# Patient Record
Sex: FEMALE | Race: ASIAN | NOT HISPANIC OR LATINO | URBAN - METROPOLITAN AREA
[De-identification: names, ages, dates, MRNs, and addresses within clinical notes are randomized per-mention and may not be internally consistent; named-entity substitution may affect disease eponyms.]

---

## 2018-10-16 ENCOUNTER — INPATIENT (INPATIENT)
Facility: HOSPITAL | Age: 48
LOS: 1 days | Discharge: HOME | DRG: 287 | End: 2018-10-18
Attending: STUDENT IN AN ORGANIZED HEALTH CARE EDUCATION/TRAINING PROGRAM | Admitting: HOSPITALIST
Payer: MEDICAID

## 2018-10-16 VITALS
HEART RATE: 94 BPM | RESPIRATION RATE: 16 BRPM | HEIGHT: 67 IN | WEIGHT: 174.17 LBS | DIASTOLIC BLOOD PRESSURE: 72 MMHG | OXYGEN SATURATION: 99 % | SYSTOLIC BLOOD PRESSURE: 138 MMHG | TEMPERATURE: 99 F

## 2018-10-16 DIAGNOSIS — R09.89 OTHER SPECIFIED SYMPTOMS AND SIGNS INVOLVING THE CIRCULATORY AND RESPIRATORY SYSTEMS: ICD-10-CM

## 2018-10-16 DIAGNOSIS — Z29.9 ENCOUNTER FOR PROPHYLACTIC MEASURES, UNSPECIFIED: ICD-10-CM

## 2018-10-16 DIAGNOSIS — R07.9 CHEST PAIN, UNSPECIFIED: ICD-10-CM

## 2018-10-16 DIAGNOSIS — I10 ESSENTIAL (PRIMARY) HYPERTENSION: ICD-10-CM

## 2018-10-16 DIAGNOSIS — E11.9 TYPE 2 DIABETES MELLITUS WITHOUT COMPLICATIONS: ICD-10-CM

## 2018-10-16 LAB
ALBUMIN SERPL ELPH-MCNC: 4.4 G/DL — SIGNIFICANT CHANGE UP (ref 3.3–5)
ALP SERPL-CCNC: 91 U/L — SIGNIFICANT CHANGE UP (ref 40–120)
ALT FLD-CCNC: 33 U/L — SIGNIFICANT CHANGE UP (ref 10–45)
ANION GAP SERPL CALC-SCNC: 13 MMOL/L — SIGNIFICANT CHANGE UP (ref 5–17)
AST SERPL-CCNC: 21 U/L — SIGNIFICANT CHANGE UP (ref 10–40)
BASOPHILS # BLD AUTO: 0 K/UL — SIGNIFICANT CHANGE UP (ref 0–0.2)
BASOPHILS NFR BLD AUTO: 0.3 % — SIGNIFICANT CHANGE UP (ref 0–2)
BILIRUB SERPL-MCNC: 0.5 MG/DL — SIGNIFICANT CHANGE UP (ref 0.2–1.2)
BUN SERPL-MCNC: 12 MG/DL — SIGNIFICANT CHANGE UP (ref 7–23)
CALCIUM SERPL-MCNC: 9.6 MG/DL — SIGNIFICANT CHANGE UP (ref 8.4–10.5)
CHLORIDE SERPL-SCNC: 103 MMOL/L — SIGNIFICANT CHANGE UP (ref 96–108)
CO2 SERPL-SCNC: 24 MMOL/L — SIGNIFICANT CHANGE UP (ref 22–31)
CREAT SERPL-MCNC: 0.71 MG/DL — SIGNIFICANT CHANGE UP (ref 0.5–1.3)
EOSINOPHIL # BLD AUTO: 0.1 K/UL — SIGNIFICANT CHANGE UP (ref 0–0.5)
EOSINOPHIL NFR BLD AUTO: 1.1 % — SIGNIFICANT CHANGE UP (ref 0–6)
GLUCOSE SERPL-MCNC: 217 MG/DL — HIGH (ref 70–99)
HCT VFR BLD CALC: 41.9 % — SIGNIFICANT CHANGE UP (ref 34.5–45)
HGB BLD-MCNC: 13.8 G/DL — SIGNIFICANT CHANGE UP (ref 11.5–15.5)
LIDOCAIN IGE QN: 46 U/L — SIGNIFICANT CHANGE UP (ref 7–60)
LYMPHOCYTES # BLD AUTO: 2.4 K/UL — SIGNIFICANT CHANGE UP (ref 1–3.3)
LYMPHOCYTES # BLD AUTO: 35.8 % — SIGNIFICANT CHANGE UP (ref 13–44)
MCHC RBC-ENTMCNC: 29.2 PG — SIGNIFICANT CHANGE UP (ref 27–34)
MCHC RBC-ENTMCNC: 33 GM/DL — SIGNIFICANT CHANGE UP (ref 32–36)
MCV RBC AUTO: 88.4 FL — SIGNIFICANT CHANGE UP (ref 80–100)
MONOCYTES # BLD AUTO: 0.7 K/UL — SIGNIFICANT CHANGE UP (ref 0–0.9)
MONOCYTES NFR BLD AUTO: 9.9 % — SIGNIFICANT CHANGE UP (ref 2–14)
NEUTROPHILS # BLD AUTO: 3.5 K/UL — SIGNIFICANT CHANGE UP (ref 1.8–7.4)
NEUTROPHILS NFR BLD AUTO: 53 % — SIGNIFICANT CHANGE UP (ref 43–77)
PLATELET # BLD AUTO: 238 K/UL — SIGNIFICANT CHANGE UP (ref 150–400)
POTASSIUM SERPL-MCNC: 4.6 MMOL/L — SIGNIFICANT CHANGE UP (ref 3.5–5.3)
POTASSIUM SERPL-SCNC: 4.6 MMOL/L — SIGNIFICANT CHANGE UP (ref 3.5–5.3)
PROT SERPL-MCNC: 7.5 G/DL — SIGNIFICANT CHANGE UP (ref 6–8.3)
RBC # BLD: 4.73 M/UL — SIGNIFICANT CHANGE UP (ref 3.8–5.2)
RBC # FLD: 12.8 % — SIGNIFICANT CHANGE UP (ref 10.3–14.5)
SODIUM SERPL-SCNC: 140 MMOL/L — SIGNIFICANT CHANGE UP (ref 135–145)
TROPONIN T, HIGH SENSITIVITY RESULT: 8 NG/L — SIGNIFICANT CHANGE UP (ref 0–51)
TROPONIN T, HIGH SENSITIVITY RESULT: 9 NG/L — SIGNIFICANT CHANGE UP (ref 0–51)
WBC # BLD: 6.6 K/UL — SIGNIFICANT CHANGE UP (ref 3.8–10.5)
WBC # FLD AUTO: 6.6 K/UL — SIGNIFICANT CHANGE UP (ref 3.8–10.5)

## 2018-10-16 PROCEDURE — 71046 X-RAY EXAM CHEST 2 VIEWS: CPT | Mod: 26

## 2018-10-16 PROCEDURE — 99285 EMERGENCY DEPT VISIT HI MDM: CPT

## 2018-10-16 PROCEDURE — 99223 1ST HOSP IP/OBS HIGH 75: CPT

## 2018-10-16 RX ORDER — FAMOTIDINE 10 MG/ML
20 INJECTION INTRAVENOUS DAILY
Qty: 0 | Refills: 0 | Status: DISCONTINUED | OUTPATIENT
Start: 2018-10-16 | End: 2018-10-18

## 2018-10-16 RX ORDER — ASPIRIN/CALCIUM CARB/MAGNESIUM 324 MG
324 TABLET ORAL ONCE
Qty: 0 | Refills: 0 | Status: COMPLETED | OUTPATIENT
Start: 2018-10-16 | End: 2018-10-16

## 2018-10-16 RX ORDER — INSULIN LISPRO 100/ML
VIAL (ML) SUBCUTANEOUS AT BEDTIME
Qty: 0 | Refills: 0 | Status: DISCONTINUED | OUTPATIENT
Start: 2018-10-16 | End: 2018-10-18

## 2018-10-16 RX ORDER — ASPIRIN/CALCIUM CARB/MAGNESIUM 324 MG
81 TABLET ORAL DAILY
Qty: 0 | Refills: 0 | Status: DISCONTINUED | OUTPATIENT
Start: 2018-10-16 | End: 2018-10-18

## 2018-10-16 RX ORDER — INSULIN LISPRO 100/ML
VIAL (ML) SUBCUTANEOUS
Qty: 0 | Refills: 0 | Status: DISCONTINUED | OUTPATIENT
Start: 2018-10-16 | End: 2018-10-18

## 2018-10-16 RX ORDER — DEXTROSE 50 % IN WATER 50 %
25 SYRINGE (ML) INTRAVENOUS ONCE
Qty: 0 | Refills: 0 | Status: DISCONTINUED | OUTPATIENT
Start: 2018-10-16 | End: 2018-10-18

## 2018-10-16 RX ORDER — INSULIN GLARGINE 100 [IU]/ML
20 INJECTION, SOLUTION SUBCUTANEOUS AT BEDTIME
Qty: 0 | Refills: 0 | Status: DISCONTINUED | OUTPATIENT
Start: 2018-10-16 | End: 2018-10-18

## 2018-10-16 RX ORDER — GLUCAGON INJECTION, SOLUTION 0.5 MG/.1ML
1 INJECTION, SOLUTION SUBCUTANEOUS ONCE
Qty: 0 | Refills: 0 | Status: DISCONTINUED | OUTPATIENT
Start: 2018-10-16 | End: 2018-10-18

## 2018-10-16 RX ORDER — DEXTROSE 50 % IN WATER 50 %
15 SYRINGE (ML) INTRAVENOUS ONCE
Qty: 0 | Refills: 0 | Status: DISCONTINUED | OUTPATIENT
Start: 2018-10-16 | End: 2018-10-18

## 2018-10-16 RX ORDER — METOPROLOL TARTRATE 50 MG
12.5 TABLET ORAL
Qty: 0 | Refills: 0 | Status: DISCONTINUED | OUTPATIENT
Start: 2018-10-16 | End: 2018-10-18

## 2018-10-16 RX ORDER — ENOXAPARIN SODIUM 100 MG/ML
40 INJECTION SUBCUTANEOUS EVERY 24 HOURS
Qty: 0 | Refills: 0 | Status: DISCONTINUED | OUTPATIENT
Start: 2018-10-16 | End: 2018-10-18

## 2018-10-16 RX ORDER — ATORVASTATIN CALCIUM 80 MG/1
80 TABLET, FILM COATED ORAL AT BEDTIME
Qty: 0 | Refills: 0 | Status: DISCONTINUED | OUTPATIENT
Start: 2018-10-16 | End: 2018-10-18

## 2018-10-16 RX ORDER — ACETAMINOPHEN 500 MG
650 TABLET ORAL EVERY 6 HOURS
Qty: 0 | Refills: 0 | Status: DISCONTINUED | OUTPATIENT
Start: 2018-10-16 | End: 2018-10-18

## 2018-10-16 RX ORDER — SODIUM CHLORIDE 9 MG/ML
1000 INJECTION, SOLUTION INTRAVENOUS
Qty: 0 | Refills: 0 | Status: DISCONTINUED | OUTPATIENT
Start: 2018-10-16 | End: 2018-10-18

## 2018-10-16 RX ADMIN — FAMOTIDINE 20 MILLIGRAM(S): 10 INJECTION INTRAVENOUS at 17:25

## 2018-10-16 RX ADMIN — Medication 30 MILLILITER(S): at 17:25

## 2018-10-16 NOTE — ED PROVIDER NOTE - NS ED ROS FT
ROS: denies HA, weakness, dizziness, fevers/chills, nausea/vomiting, diaphoresis, back/neck pain, dysuria/hematuria, or rash  +ab pain, chest pain, sob

## 2018-10-16 NOTE — H&P ADULT - NSHPSOCIALHISTORY_GEN_ALL_CORE
No family hx of MI or CVA  Social History:    Marital Status:  (   )    (   ) Single    (   )    (  )   Occupation:   Lives with: (  ) alone  (  ) children   (  ) spouse   (  ) parents  (  ) other    Substance Use (street drugs): ( x ) never used  (  ) other:  Tobacco Usage:  ( x  ) never smoked   (   ) former smoker   (   ) current smoker  (     ) pack year  (        ) last cigarette date  Alcohol Usage: no etoh use No family hx of MI or CVA  Social History:    Marital Status:  ( x  )    (   ) Single    (   )    (  )   Occupation:   Lives with: (  ) alone  (  x) children   (  ) spouse   (  ) parents  (  ) other    Substance Use (street drugs): ( x ) never used  (  ) other:  Tobacco Usage:  ( x  ) never smoked   (   ) former smoker   (   ) current smoker  (     ) pack year  (        ) last cigarette date  Alcohol Usage: no etoh use

## 2018-10-16 NOTE — ED ADULT NURSE NOTE - NSIMPLEMENTINTERV_GEN_ALL_ED
Implemented All Universal Safety Interventions:  [place] to call system. Call bell, personal items and telephone within reach. Instruct patient to call for assistance. Room bathroom lighting operational. Non-slip footwear when patient is off stretcher. Physically safe environment: no spills, clutter or unnecessary equipment. Stretcher in lowest position, wheels locked, appropriate side rails in place.

## 2018-10-16 NOTE — ED PROVIDER NOTE - PHYSICAL EXAMINATION
Gen: AAOx3, non-toxic  Head: NCAT  HEENT: EOMI, oral mucosa moist, normal conjunctiva  Lung: CTAB, no respiratory distress, no wheezes/rhonchi/rales B/L, speaking in full sentences  CV: RRR, no murmurs, rubs or gallops  Abd: soft, tenderness in the epigastric region, ND, no guarding, no CVA tenderness, neg desai's sign  MSK: no visible deformities  Neuro: No focal sensory or motor deficits  Skin: Warm, well perfused, no rash  Psych: normal affect.   ~Ellen Ortega PGY1

## 2018-10-16 NOTE — ED PROVIDER NOTE - MEDICAL DECISION MAKING DETAILS
Attending Matt Wisdom DO: 49 yo female hx of DM, HTN, HLD, presents with chest pain x "years", worsening over past few weeks. Follows with cardiologist at [facility], has angiogram scheduled in 3 days. Also with abdominal discomfort in epigastrium, non radiating. No nausea or vomiting. Chest pain radiates to left arm, "all the time", nonexertional. PE: well appearing, nad, ab soft, +mild epigastric ttp, no rebound or guarding. A/P: pt with atypical chest pain, ekg normal, scheduled for angio in 3 days at outside facility. Will check labs, ekg, trop, cxr, ?delta trop and outpatient follow up vs admit for stress and possible angio here.

## 2018-10-16 NOTE — H&P ADULT - NSHPREVIEWOFSYSTEMS_GEN_ALL_CORE
CONSTITUTIONAL: No weakness, fevers or chills  EYES/ENT: No visual changes;  No dysphagia  NECK: No pain or stiffness  RESPIRATORY: No cough, wheezing, hemoptysis; + shortness of breath  CARDIOVASCULAR: + chest pain  no  palpitations; No lower extremity edema  EXTREMITIES: no le edema, cyanosis, clubbing  GASTROINTESTINAL: No abdominal or epigastric pain. No nausea, vomiting, or hematemesis; No diarrhea or constipation. No melena or hematochezia.  BACK: No back pain  GENITOURINARY: No dysuria, frequency or hematuria  NEUROLOGICAL: No numbness or weakness  MSK: no joint swelling or pain  SKIN: No itching, burning, rashes, or lesions   PSYCH: no agitation  All other review of systems is negative unless indicated above.

## 2018-10-16 NOTE — ED PROVIDER NOTE - PMH
On Nifedipine XL 90 mg daily at home. BP has been controlled here.   - goal BP <150/90  - c/w nifedipine XL 90 mg daily    Fran To, PGY-1  Cardiology Consult Service  Pager# [phone] DM2 (diabetes mellitus, type 2)    HTN (hypertension)

## 2018-10-16 NOTE — H&P ADULT - NSHPPHYSICALEXAM_GEN_ALL_CORE
Vital Signs Last 24 Hrs  T(C): 37.1 (16 Oct 2018 18:46), Max: 37.1 (16 Oct 2018 18:46)  T(F): 98.8 (16 Oct 2018 18:46), Max: 98.8 (16 Oct 2018 18:46)  HR: 66 (16 Oct 2018 18:46) (66 - 94)  BP: 105/65 (16 Oct 2018 18:46) (105/65 - 138/72)  BP(mean): --  RR: 18 (16 Oct 2018 18:46) (16 - 18)  SpO2: 99% (16 Oct 2018 18:46) (99% - 99%) Vital Signs Last 24 Hrs  T(C): 37.1 (16 Oct 2018 18:46), Max: 37.1 (16 Oct 2018 18:46)  T(F): 98.8 (16 Oct 2018 18:46), Max: 98.8 (16 Oct 2018 18:46)  HR: 66 (16 Oct 2018 18:46) (66 - 94)  BP: 105/65 (16 Oct 2018 18:46) (105/65 - 138/72)  BP(mean): --  RR: 18 (16 Oct 2018 18:46) (16 - 18)  SpO2: 99% (16 Oct 2018 18:46) (99% - 99%)    GENERAL: No acute distress, well-developed  HEAD:  Atraumatic, Normocephalic  ENT: EOMI, PERRLA, conjunctiva and sclera clear,  moist mucosa no pharyngeal erythema or exudates   NECK: supple , no JVD   CHEST/LUNG: Clear to auscultation bilaterally; No wheeze, equal breath sounds bilaterally   BACK: No spinal tenderness,  No CVA tenderness   HEART: Regular rate and rhythm; No murmurs, rubs, or gallops  ABDOMEN: Soft, Nontender, Nondistended; Bowel sounds present  EXTREMITIES:  No clubbing, cyanosis, or edema  MSK: No joint swelling or effusions, ROM intact   PSYCH: Normal behavior/affect  NEUROLOGY: AAOx3, non-focal, cranial nerves intact  SKIN: Normal color, No rashes or lesions

## 2018-10-16 NOTE — ED PROVIDER NOTE - OBJECTIVE STATEMENT
Pt is a 49 yo F with pmhx of HTN, DM2, HLD presents with 2 week of constant substernal chest pain that radiates down her left arm. Pt states that her pain has been constant and gradually getting worst. Pt describes the pain as a squeezing chest pain that's both at rest and on exertion. She also reports SOB on exertion but denies n&V. Pt also report epigastric pain that also started 1 week ago. She describes the epigastric pain as a non radiating burning pain that is worst at night . Pt follows a outside cardiologist and has an appt for a cardiac angiogram in 2 days at a [facility]. She denies cough, fever, chills, vision changes, constipation, diarrhea blood in stool or urine.

## 2018-10-16 NOTE — ED ADULT NURSE REASSESSMENT NOTE - NS ED NURSE REASSESS COMMENT FT1
provided two gowns and fall socks for patient. patient states she feels too uncomfortable to take her pants off at this time.   privacy screens and chair provided for patient and family. pt resting comfortably in no acute distress.

## 2018-10-16 NOTE — CONSULT NOTE ADULT - SUBJECTIVE AND OBJECTIVE BOX
Cardiology Attending Consult Note      CHIEF COMPLAINT/REASON FOR CONSULT: chest pain  Date of Admission: 10-16-18    Translation service utilized : Kiswahili  # [id number]    HISTORY OF PRESENT ILLNESS:    48y.o. Female with DM, HTN, HL, now admitted with chest pain x 2 weeks. She reports that for the last 2 weeks she has had chest pain (substernal, pressure like, 5/10, some radiation to left arm, worse on exertion). She also notes burning in her stomach and epigastric area. She denies shortness of breath. No N/V/D/f/C. She was seen by a cardiologist at [facility] (Dr. Cummins), and underwent a stress test which she was told was positive, and was scheduled for angiography on 10/19/2018, however she's requested a repeat evaluation here. At the time of my visit she has mild chest pain (1-2/10).    ALLERGIES:  NKDA    Home Medications:  ASA 81 mg po QD  Atorvastatin 20 gm po QHS  Janumet      MEDICATIONS:      famotidine    Tablet 20 milliGRAM(s) Oral daily          PAST MEDICAL & SURGICAL HISTORY:  DM2 (diabetes mellitus, type 2)  HTN (hypertension)  No significant past surgical history      FAMILY HISTORY:  No family hx of MI or CVA    SOCIAL HISTORY:    Never smoked, no ETOH, no IVDU    REVIEW OF SYSTEMS:    CONSTITUTIONAL: No weakness, fevers or chills  EYES/ENT: No visual changes;  No vertigo or throat pain   NECK: No pain or stiffness  RESPIRATORY: No cough, wheezing, hemoptysis; No shortness of breath  CARDIOVASCULAR: +chest pain, No palpitations  GASTROINTESTINAL: No abdominal or epigastric pain. No nausea, vomiting, or hematemesis; No diarrhea or constipation. No melena or hematochezia.  GENITOURINARY: No dysuria, frequency or hematuria  NEUROLOGICAL: No numbness or weakness  SKIN: No itching, burning, rashes, or lesions   All other review of systems is negative unless indicated above.    PHYSICAL EXAM:  T(C): 37.1 (10-16-18 @ 18:46), Max: 37.1 (10-16-18 @ 18:46)  HR: 66 (10-16-18 @ 18:46) (66 - 94)  BP: 105/65 (10-16-18 @ 18:46) (105/65 - 138/72)  RR: 18 (10-16-18 @ 18:46) (16 - 18)  SpO2: 99% (10-16-18 @ 18:46) (99% - 99%)  Wt(kg): --    Drug Dosing Weight  Height (cm): 170.18 (16 Oct 2018 14:54)  Weight (kg): 79 (16 Oct 2018 14:54)  BMI (kg/m2): 27.3 (16 Oct 2018 14:54)  BSA (m2): 1.91 (16 Oct 2018 14:54)    I&O's Summary      Appearance: Normal	  HEENT:   Normal oral mucosa, PERRL, EOMI	  Lymphatic: No lymphadenopathy  Cardiovascular: Normal S1 S2, No JVD, No murmurs  Respiratory: Lungs clear to auscultation	  Psychiatry: A & O x 3, Mood & affect appropriate  Gastrointestinal:  Soft, Non-tender, + BS	  Skin: No rashes, No ecchymoses, No cyanosis	  Neurologic: Non-focal  Extremities: Normal range of motion, No clubbing, cyanosis or edema  Vascular: Peripheral pulses palpable 2+ bilaterally    LABS:	 	    CBC Full  -  ( 16 Oct 2018 17:07 )  WBC Count : 6.6 K/uL  Hemoglobin : 13.8 g/dL  Hematocrit : 41.9 %  Platelet Count - Automated : 238 K/uL  Mean Cell Volume : 88.4 fl  Mean Cell Hemoglobin : 29.2 pg  Mean Cell Hemoglobin Concentration : 33.0 gm/dL  Auto Neutrophil # : 3.5 K/uL  Auto Lymphocyte # : 2.4 K/uL  Auto Monocyte # : 0.7 K/uL  Auto Eosinophil # : 0.1 K/uL  Auto Basophil # : 0.0 K/uL  Auto Neutrophil % : 53.0 %  Auto Lymphocyte % : 35.8 %  Auto Monocyte % : 9.9 %  Auto Eosinophil % : 1.1 %  Auto Basophil % : 0.3 %    10-16    140  |  103  |  12  ----------------------------<  217<H>  4.6   |  24  |  0.71    Ca    9.6      16 Oct 2018 17:07    TPro  7.5  /  Alb  4.4  /  TBili  0.5  /  DBili  x   /  AST  21  /  ALT  33  /  AlkPhos  91  10-16      LIVER FUNCTIONS - ( 16 Oct 2018 17:07 )  Alb: 4.4 g/dL / Pro: 7.5 g/dL / ALK PHOS: 91 U/L / ALT: 33 U/L / AST: 21 U/L / GGT: x             EKG: NSR, no ST or TW Changes    Telemetry: NSR, no events    CXR: clear lungs BL    TTE: None      A/P: 48y.o. Female with DM, HTN, HL, now admitted with chest pain x 2 weeks. She reports that for the last 2 weeks she has had chest pain (substernal, pressure like, 5/10, some radiation to left arm, worse on exertion). She also notes burning in her stomach and epigastric area. She denies shortness of breath. No N/V/D/f/C. No palpitations. She was seen by a cardiologist at [facility] (Dr. Cummins), and underwent a stress test which she was told was positive, and was scheduled for angiography on 10/19/2018, however she's requested a repeat evaluation here. At the time of my visit she has mild chest pain (1-2/10).    1. Chest Pain - Patient with progressive chest pain over the last 2 weeks, both at rest and on exertion, concerning for stable vs progressive angina. Doubt acute atherothrombotic event as cardiac enzymes negative x 2 (hsTrop 8 -> 9), EKG without ST or TW changes.  -Given multiple risk factors including DM, HTN, HL, would evaluate for reversible ischemia with pharmacologic MPI.  -Serial EKG if progression or recurrence in chest pain (important to capture EKG a time chest pain is occurring)  -Cont ASA 81 mg po QD  -Start Metoprolol 12.5 mg po BID for anti-anginal therapy  -Increase Atorvastatin to 80 mg po QHS  -Agree with Empiric therapy for GERD with Pepcid 20 mg po QD  -Monitor on Telemetry  -Please order Pharmacologic MPI.   -Please keep NPO (no caffeine) after midnight in anticipation of MPI.    2. DM - DEAN  -Please order Hemoglobin A1c, Lipid Panel, TSH, BNP    3. HTN - BP controlled (138/72)  -Start Metoprolol as above for anti-anginal therapy    Thank you for this interesting consult. My team will continue to follow along with you.    South Wilson MD  Cardiology Attending  [facility]   Cell: [phone]  (Cardiology Nocturnist cell number available 7 pm - 7 am every night; available daytime week days for follow-up only; daytime weekends covered by general cardiology consult service)

## 2018-10-16 NOTE — ED ADULT NURSE NOTE - OBJECTIVE STATEMENT
48 y.o female presents to the ed c.o chest pain for years. hx of htn, dm, hld. states her chest pain has been worsening , has an angiogram scheduled in three days at outside facility. states her chest pain is in her chest , moving to her left arm and this is her baseline chest pain. Patient also c.o abdominal pain, beginning in the epigastric area and radiating around her abdomen. Patient denies nausea, vomiting, diarrhea. patient is well appearing, abdomen is soft ntnd, ekg nsr and patient is in no acute distress. placed on cardiac monitor and large bore IV placed by Martha KIRKPATRICK in right ac. patient took one baby aspirin this morning.

## 2018-10-16 NOTE — ED ADULT TRIAGE NOTE - CHIEF COMPLAINT QUOTE
pt reports chest pain over the last few weeks that worsened today. pt scheduled to have angiogram in a few days  h/o DM, HTN, HLD

## 2018-10-16 NOTE — H&P ADULT - PROBLEM SELECTOR PLAN 2
-Hold oral hypoglycemics  -Start HISS, check fsg qac/qhs  -check a1c in am  -consistent carb diet  - Lantus 20 U Hs

## 2018-10-16 NOTE — H&P ADULT - PROBLEM SELECTOR PLAN 1
CXR clear , no ischemic ekg changes , negative cardiac enzymes,  however, high risk for  cardiac disease cardiology recommends inpatient stress,  given recent travel preceding symptoms as well as pleuritic characteristics would check for PE .   - Cardiology consult appreciated   - telemetry   - Nuclear stress test   - f/u DDimer - if positive obtain CTA chest   - am lipid panel , TSH , and A1c   - continue daily aspirin   - Atorvastatin 80mg   - starting  metoprolol 12.5 bid CXR clear , no ischemic ekg changes , negative cardiac enzymes,  however, high risk for  cardiac disease cardiology recommends inpatient stress,  given recent travel preceding symptoms as well as pleuritic characteristics would check for PE .   - Cardiology consult appreciated   - telemetry   - Nuclear stress test   - f/u DDimer - if positive obtain CTA chest   - Venous duplex LE  to r/o DVT   - am lipid panel , TSH , and A1c   - continue daily aspirin   - Atorvastatin 80mg   - starting  metoprolol 12.5 bid

## 2018-10-16 NOTE — H&P ADULT - HISTORY OF PRESENT ILLNESS
Patient is a 48 year old female with pmhx of HTN, DM2, HLD presents with chest pain for the past two weeks. Patient reports constant substernal chest pain, worsening over the past two weeks, radiates down her left arm,  described as a squeezing in quality ,  both at rest and on exertion, not relieved by anything in particular. She also reports  increased dyspnea on exertion.  Patient also reports  intermittent epigastric burning pain . Patient was evaluated by a cardiologist ( at [facility]) and  scheduled for a cardiac catheterization. However symptoms have remained persistent prompting patient to seek more urgent care.   Patient reports no fever, no cough .No palpitations , no nausea or vomiting . no lower extremity edema. No orthopnea. history obtained via [facility] phone  #[phone], and as well as  patients daughter   Patient is a 48 year old female with pmhx of HTN, DM2, HLD presents with chest pain for the past two weeks. Patient reports constant substernal chest pain, worsening over the past two weeks, radiates down her left arm,  described as a squeezing in quality , worsened with deep breaths, occuring  both at rest and on exertion, not relieved by anything in particular. She also reports  increased dyspnea on exertion.  Patient also reports  intermittent epigastric burning pain . Patient was evaluated by a cardiologist ( at [facility]) and  scheduled for a cardiac catheterization. However symptoms have remained persistent prompting patient to seek more urgent care.   Patient reports no fever, no cough .No palpitations , no nausea or vomiting . no lower extremity edema. No orthopnea.  She also reports ongoing  right lower extremity pain over the past year. She reports recent trip from [facility] about one month prior to admission.

## 2018-10-16 NOTE — H&P ADULT - NSHPLABSRESULTS_GEN_ALL_CORE
Labs personally reviewed:                          13.8   6.6   )-----------( 238      ( 16 Oct 2018 17:07 )             41.9     10-16    140  |  103  |  12  ----------------------------<  217<H>  4.6   |  24  |  0.71    Ca    9.6      16 Oct 2018 17:07    TPro  7.5  /  Alb  4.4  /  TBili  0.5  /  DBili  x   /  AST  21  /  ALT  33  /  AlkPhos  91  10-16      LIVER FUNCTIONS - ( 16 Oct 2018 17:07 )  Alb: 4.4 g/dL / Pro: 7.5 g/dL / ALK PHOS: 91 U/L / ALT: 33 U/L / AST: 21 U/L / GGT: x           CAPILLARY BLOOD GLUCOSE    Imaging:  CXR personally reviewed: no focal opacity    EKG personally reviewed: normal sinus rhythm, 75  Normal axis, Normal TX interval and QRS complex. There are no acute ischemic ST or T-wave changes

## 2018-10-16 NOTE — ED ADULT NURSE REASSESSMENT NOTE - NS ED NURSE REASSESS COMMENT FT1
patient states she felt relief in her chest from the medication.   educated family and patient on reason for second troponin.   vital signs stable remains nsr on monitor. in no distress.

## 2018-10-17 DIAGNOSIS — Z29.9 ENCOUNTER FOR PROPHYLACTIC MEASURES, UNSPECIFIED: ICD-10-CM

## 2018-10-17 DIAGNOSIS — I20.0 UNSTABLE ANGINA: ICD-10-CM

## 2018-10-17 DIAGNOSIS — I10 ESSENTIAL (PRIMARY) HYPERTENSION: ICD-10-CM

## 2018-10-17 DIAGNOSIS — E78.5 HYPERLIPIDEMIA, UNSPECIFIED: ICD-10-CM

## 2018-10-17 LAB
ALBUMIN SERPL ELPH-MCNC: 4.1 G/DL — SIGNIFICANT CHANGE UP (ref 3.3–5)
ALP SERPL-CCNC: 76 U/L — SIGNIFICANT CHANGE UP (ref 40–120)
ALT FLD-CCNC: 32 U/L — SIGNIFICANT CHANGE UP (ref 10–45)
ANION GAP SERPL CALC-SCNC: 10 MMOL/L — SIGNIFICANT CHANGE UP (ref 5–17)
AST SERPL-CCNC: 18 U/L — SIGNIFICANT CHANGE UP (ref 10–40)
BASOPHILS # BLD AUTO: 0 K/UL — SIGNIFICANT CHANGE UP (ref 0–0.2)
BASOPHILS NFR BLD AUTO: 0.7 % — SIGNIFICANT CHANGE UP (ref 0–2)
BILIRUB SERPL-MCNC: 0.7 MG/DL — SIGNIFICANT CHANGE UP (ref 0.2–1.2)
BUN SERPL-MCNC: 12 MG/DL — SIGNIFICANT CHANGE UP (ref 7–23)
CALCIUM SERPL-MCNC: 9.1 MG/DL — SIGNIFICANT CHANGE UP (ref 8.4–10.5)
CHLORIDE SERPL-SCNC: 106 MMOL/L — SIGNIFICANT CHANGE UP (ref 96–108)
CHOLEST SERPL-MCNC: 96 MG/DL — SIGNIFICANT CHANGE UP (ref 10–199)
CO2 SERPL-SCNC: 25 MMOL/L — SIGNIFICANT CHANGE UP (ref 22–31)
CREAT SERPL-MCNC: 0.78 MG/DL — SIGNIFICANT CHANGE UP (ref 0.5–1.3)
D DIMER BLD IA.RAPID-MCNC: <150 NG/ML DDU — SIGNIFICANT CHANGE UP
EOSINOPHIL # BLD AUTO: 0.1 K/UL — SIGNIFICANT CHANGE UP (ref 0–0.5)
EOSINOPHIL NFR BLD AUTO: 1.8 % — SIGNIFICANT CHANGE UP (ref 0–6)
GLUCOSE SERPL-MCNC: 157 MG/DL — HIGH (ref 70–99)
HBA1C BLD-MCNC: 9.2 % — HIGH (ref 4–5.6)
HCT VFR BLD CALC: 41.2 % — SIGNIFICANT CHANGE UP (ref 34.5–45)
HDLC SERPL-MCNC: 40 MG/DL — LOW
HGB BLD-MCNC: 13.6 G/DL — SIGNIFICANT CHANGE UP (ref 11.5–15.5)
LIPID PNL WITH DIRECT LDL SERPL: 44 MG/DL — SIGNIFICANT CHANGE UP
LYMPHOCYTES # BLD AUTO: 2.5 K/UL — SIGNIFICANT CHANGE UP (ref 1–3.3)
LYMPHOCYTES # BLD AUTO: 40 % — SIGNIFICANT CHANGE UP (ref 13–44)
MCHC RBC-ENTMCNC: 29.2 PG — SIGNIFICANT CHANGE UP (ref 27–34)
MCHC RBC-ENTMCNC: 33 GM/DL — SIGNIFICANT CHANGE UP (ref 32–36)
MCV RBC AUTO: 88.5 FL — SIGNIFICANT CHANGE UP (ref 80–100)
MONOCYTES # BLD AUTO: 0.6 K/UL — SIGNIFICANT CHANGE UP (ref 0–0.9)
MONOCYTES NFR BLD AUTO: 9.6 % — SIGNIFICANT CHANGE UP (ref 2–14)
NEUTROPHILS # BLD AUTO: 3 K/UL — SIGNIFICANT CHANGE UP (ref 1.8–7.4)
NEUTROPHILS NFR BLD AUTO: 47.8 % — SIGNIFICANT CHANGE UP (ref 43–77)
NT-PROBNP SERPL-SCNC: 12 PG/ML — SIGNIFICANT CHANGE UP (ref 0–300)
PLATELET # BLD AUTO: 215 K/UL — SIGNIFICANT CHANGE UP (ref 150–400)
POTASSIUM SERPL-MCNC: 3.8 MMOL/L — SIGNIFICANT CHANGE UP (ref 3.5–5.3)
POTASSIUM SERPL-SCNC: 3.8 MMOL/L — SIGNIFICANT CHANGE UP (ref 3.5–5.3)
PROT SERPL-MCNC: 6.8 G/DL — SIGNIFICANT CHANGE UP (ref 6–8.3)
RBC # BLD: 4.66 M/UL — SIGNIFICANT CHANGE UP (ref 3.8–5.2)
RBC # FLD: 12.7 % — SIGNIFICANT CHANGE UP (ref 10.3–14.5)
SODIUM SERPL-SCNC: 141 MMOL/L — SIGNIFICANT CHANGE UP (ref 135–145)
TOTAL CHOLESTEROL/HDL RATIO MEASUREMENT: 2.4 RATIO — LOW (ref 3.3–7.1)
TRIGL SERPL-MCNC: 58 MG/DL — SIGNIFICANT CHANGE UP (ref 10–149)
TSH SERPL-MCNC: 4.24 UIU/ML — HIGH (ref 0.27–4.2)
WBC # BLD: 6.2 K/UL — SIGNIFICANT CHANGE UP (ref 3.8–10.5)
WBC # FLD AUTO: 6.2 K/UL — SIGNIFICANT CHANGE UP (ref 3.8–10.5)

## 2018-10-17 PROCEDURE — 93010 ELECTROCARDIOGRAM REPORT: CPT

## 2018-10-17 PROCEDURE — 99233 SBSQ HOSP IP/OBS HIGH 50: CPT

## 2018-10-17 PROCEDURE — 93970 EXTREMITY STUDY: CPT | Mod: 26

## 2018-10-17 RX ORDER — INFLUENZA VIRUS VACCINE 15; 15; 15; 15 UG/.5ML; UG/.5ML; UG/.5ML; UG/.5ML
0.5 SUSPENSION INTRAMUSCULAR ONCE
Qty: 0 | Refills: 0 | Status: COMPLETED | OUTPATIENT
Start: 2018-10-17 | End: 2018-10-17

## 2018-10-17 RX ORDER — SENNA PLUS 8.6 MG/1
2 TABLET ORAL AT BEDTIME
Qty: 0 | Refills: 0 | Status: DISCONTINUED | OUTPATIENT
Start: 2018-10-17 | End: 2018-10-18

## 2018-10-17 RX ADMIN — Medication 30 MILLILITER(S): at 11:33

## 2018-10-17 RX ADMIN — Medication 3: at 19:03

## 2018-10-17 RX ADMIN — ENOXAPARIN SODIUM 40 MILLIGRAM(S): 100 INJECTION SUBCUTANEOUS at 05:41

## 2018-10-17 RX ADMIN — Medication 81 MILLIGRAM(S): at 11:33

## 2018-10-17 RX ADMIN — FAMOTIDINE 20 MILLIGRAM(S): 10 INJECTION INTRAVENOUS at 11:33

## 2018-10-17 RX ADMIN — Medication 1: at 09:00

## 2018-10-17 RX ADMIN — Medication 30 MILLILITER(S): at 21:47

## 2018-10-17 RX ADMIN — INSULIN GLARGINE 20 UNIT(S): 100 INJECTION, SOLUTION SUBCUTANEOUS at 21:47

## 2018-10-17 RX ADMIN — SENNA PLUS 2 TABLET(S): 8.6 TABLET ORAL at 21:47

## 2018-10-17 RX ADMIN — Medication 12.5 MILLIGRAM(S): at 05:41

## 2018-10-17 RX ADMIN — Medication 12.5 MILLIGRAM(S): at 19:03

## 2018-10-17 RX ADMIN — Medication 1: at 12:38

## 2018-10-17 RX ADMIN — ATORVASTATIN CALCIUM 80 MILLIGRAM(S): 80 TABLET, FILM COATED ORAL at 21:47

## 2018-10-17 NOTE — PROGRESS NOTE ADULT - ATTENDING COMMENTS
Thank you. My team will follow.    Tono Kumari M.D, F.A.C.C  [facility]   [phone]

## 2018-10-17 NOTE — PROGRESS NOTE ADULT - PROBLEM SELECTOR PLAN 1
CXR clear , no ischemic ekg changes , negative cardiac enzymes,   abnormal stress test as an outpatient, mild reversible defect in anterior wall. Due ongoing chest pain, she was scheduled to have a cardiac tomorrow at [place] but presented to [facility] ED last night  - DDimer: neg, b/l LE: neg for DVT  - Cardiology consult appreciated   - Continue ASA 81mg po daily, toprol xl 12.5mg po daily, lipitor 40mg po daily. - Scheduled for cardiac cath today r/o obstructive CAD.  - TTE for evaluation of LV function and r/o valvular abnormalities.   - telemetry   - continue daily aspirin ,Atorvastatin 80mg , metoprolol 12.5 bid

## 2018-10-17 NOTE — PROGRESS NOTE ADULT - PROBLEM SELECTOR PLAN 1
Continue ASA 81mg po daily, toprol xl 12.5mg po daily, lipitor 40mg po daily. Scheduled for cardiac cath today r/o obstructive CAD.  I have spoken with primary care physician, patient had an abnormal stress test as an outpatient, mild reversible defect in anterior wall. Due ongoing chest pain, she was scheduled to have a cardiac tomorrow at [place] but presented to [facility] ED last night. TTE for evaluation of LV function and r/o valvular abnormalities.

## 2018-10-17 NOTE — PROGRESS NOTE ADULT - PROBLEM SELECTOR PLAN 2
-Hold oral hypoglycemics  -Start HISS,   -check a1c: 9.2 uncontrolled   -consistent carb diet  - Lantus 20 U Hs  - FS well controlled inpatient

## 2018-10-17 NOTE — PROGRESS NOTE ADULT - SUBJECTIVE AND OBJECTIVE BOX
Patient is a 48y old  Female who presents with a chief complaint of chest pain x two weeks (17 Oct 2018 15:16)    [id number]     SUBJECTIVE / OVERNIGHT EVENTS: Patient seen and examined at bedside. Denies any SOB, abdominal pain and n/v. She c/o pleuritic chest pain.      ROS:  All other review of systems negative    Allergies    No Known Allergies    Intolerances        MEDICATIONS  (STANDING):  aspirin enteric coated 81 milliGRAM(s) Oral daily  atorvastatin 80 milliGRAM(s) Oral at bedtime  dextrose 5%. 1000 milliLiter(s) (50 mL/Hr) IV Continuous <Continuous>  dextrose 50% Injectable 25 Gram(s) IV Push once  enoxaparin Injectable 40 milliGRAM(s) SubCutaneous every 24 hours  famotidine    Tablet 20 milliGRAM(s) Oral daily  influenza   Vaccine 0.5 milliLiter(s) IntraMuscular once  insulin glargine Injectable (LANTUS) 20 Unit(s) SubCutaneous at bedtime  insulin lispro (HumaLOG) corrective regimen sliding scale   SubCutaneous three times a day before meals  insulin lispro (HumaLOG) corrective regimen sliding scale   SubCutaneous at bedtime  metoprolol tartrate 12.5 milliGRAM(s) Oral two times a day    MEDICATIONS  (PRN):  acetaminophen   Tablet .. 650 milliGRAM(s) Oral every 6 hours PRN Mild Pain (1 - 3)  dextrose 40% Gel 15 Gram(s) Oral once PRN Blood Glucose LESS THAN 70 milliGRAM(s)/deciliter  glucagon  Injectable 1 milliGRAM(s) IntraMuscular once PRN Glucose LESS THAN 70 milligrams/deciliter      Vital Signs Last 24 Hrs  T(C): 36.6 (17 Oct 2018 08:17), Max: 37.1 (16 Oct 2018 18:46)  T(F): 97.8 (17 Oct 2018 08:17), Max: 98.8 (16 Oct 2018 18:46)  HR: 635 (17 Oct 2018 08:17) (62 - 635)  BP: 99/65 (17 Oct 2018 08:17) (99/65 - 105/65)  BP(mean): --  RR: 18 (17 Oct 2018 08:17) (18 - 18)  SpO2: 97% (17 Oct 2018 08:17) (96% - 99%)  CAPILLARY BLOOD GLUCOSE      POCT Blood Glucose.: 168 mg/dL (17 Oct 2018 12:19)  POCT Blood Glucose.: 157 mg/dL (17 Oct 2018 08:36)  POCT Blood Glucose.: 159 mg/dL (16 Oct 2018 23:37)    I&O's Summary    17 Oct 2018 07:01  -  17 Oct 2018 16:11  --------------------------------------------------------  IN: 240 mL / OUT: 0 mL / NET: 240 mL        PHYSICAL EXAM:  GENERAL: NAD, well-developed  HEAD:  Atraumatic, Normocephalic  EYES: EOMI, PERRLA, conjunctiva and sclera clear  NECK: Supple, No JVD  CHEST/LUNG: Clear to auscultation bilaterally; No wheeze  HEART: Regular rate and rhythm; No murmurs, rubs, or gallops  ABDOMEN: Soft, Nontender, Nondistended; Bowel sounds present  EXTREMITIES:  2+ Peripheral Pulses, No clubbing, cyanosis, or edema  NEUROLOGY: AAOx3, non-focal  PSYCH: calm  SKIN: No rashes or lesions    LABS:                        13.6   6.2   )-----------( 215      ( 17 Oct 2018 06:58 )             41.2     10-17    141  |  106  |  12  ----------------------------<  157<H>  3.8   |  25  |  0.78    Ca    9.1      17 Oct 2018 06:58    TPro  6.8  /  Alb  4.1  /  TBili  0.7  /  DBili  x   /  AST  18  /  ALT  32  /  AlkPhos  76  10-17              RADIOLOGY & ADDITIONAL TESTS:    Imaging Personally Reviewed: B/L LE doppler results reviewed     Consultant(s) Notes Reviewed:  Cardiology rec noted     Care Discussed with Consultants/Other Providers: D/w Dr. Kumari, pt to have [facility] today     Case Discussed with Family: d/w daughter Kiley over the phone

## 2018-10-17 NOTE — DIETITIAN INITIAL EVALUATION ADULT. - ENERGY NEEDS
IBW=  135  lbs +/- 10%  Admission history: 48 F w/   PMH of HTN, DM2, HLD, admitted  with c/o chest pain x 2 weeks .

## 2018-10-17 NOTE — PROGRESS NOTE ADULT - SUBJECTIVE AND OBJECTIVE BOX
PROGRESS NOTE  Reason for follow up: chest pain  Overnight: No new events.   Update: Patient lying in bed complaining of chest pressure and shortness of breath. Repeat EKG done at time of chest pain negative for ischemia, no st-t wave changes.     Subjective: "I have pain"  Complains of: chest pain  Review of symptoms: Cardiac:  No chest pain. No dyspnea. No palpitations.  Respiratory:no cough. No dyspnea  Gastrointestinal: No diarrhea. No abdominal pain. No bleeding.     Past medical history: No updates.   Chronic conditions:  Hypertension: controlled. DM: Stable. Hyperlipidemia: Stable;    	  Vitals:  T(C): 36.6 (10-17-18 @ 08:17), Max: 37.1 (10-16-18 @ 18:46)  HR: 635 (10-17-18 @ 08:17) (62 - 635)  BP: 99/65 (10-17-18 @ 08:17) (99/65 - 105/65)  RR: 18 (10-17-18 @ 08:17) (18 - 18)  SpO2: 97% (10-17-18 @ 08:17) (96% - 99%)  Wt(kg): --  I&O's Summary    17 Oct 2018 07:01  -  17 Oct 2018 15:16  --------------------------------------------------------  IN: 240 mL / OUT: 0 mL / NET: 240 mL      Weight (kg): 80 (10-16 @ 23:33)    PHYSICAL EXAM:  Appearance: Comfortable. No acute distress  HEENT:  Head and neck: Atraumatic. Normocephalic.  Normal oral mucosa, PERRL, Neck is supple. No JVD, No carotid bruit.   Neurologic: A & O x 3, no focal deficits. EOMI   Lymphatic: No cervical lymphadenopathy  Cardiovascular: Normal S1 S2, No murmur, rubs/gallops. No JVD, No edema  Respiratory: Lungs clear to auscultation  Gastrointestinal:  Soft, Non-tender, + BS  Lower Extremities: No edema  Psychiatry: Patient is calm. No agitation. Mood & affect appropriate  Skin: No rashes/ ecchymoses/cyanosis/ulcers visualized on the face, hands or feet.    CURRENT MEDICATIONS:  metoprolol tartrate 12.5 milliGRAM(s) Oral two times a day    famotidine    Tablet  atorvastatin  dextrose 50% Injectable  insulin glargine Injectable (LANTUS)  insulin lispro (HumaLOG) corrective regimen sliding scale  insulin lispro (HumaLOG) corrective regimen sliding scale  aspirin enteric coated  dextrose 5%.  enoxaparin Injectable  influenza   Vaccine      LABS:	 	  CARDIAC MARKERS ( 17 Oct 2018 06:58 )  x     / x     / x     / x     / x      p-BNP 17 Oct 2018 06:58: 12 pg/mL                            13.6   6.2   )-----------( 215      ( 17 Oct 2018 06:58 )             41.2     10-17    141  |  106  |  12  ----------------------------<  157<H>  3.8   |  25  |  0.78    Ca    9.1      17 Oct 2018 06:58    TPro  6.8  /  Alb  4.1  /  TBili  0.7  /  DBili  x   /  AST  18  /  ALT  32  /  AlkPhos  76  10-17    proBNP: Serum Pro-Brain Natriuretic Peptide: 12 pg/mL (10-17 @ 06:58)    Lipid Profile: Date: 10-17 @ 08:57  Total cholesterol 96; Direct LDL: 44; HDL: 40; Triglycerides:58    HgA1c: Hemoglobin A1C, Whole Blood: 9.2 %  TSH:     TELEMETRY: Reviewed    ECG:  Reviewed by me. 	normal sinus rhythm, no st-t wave changes    DIAGNOSTIC TESTING:    [ ]  Catheterization: pending

## 2018-10-17 NOTE — DIETITIAN INITIAL EVALUATION ADULT. - PROBLEM SELECTOR PLAN 1
CXR clear , no ischemic ekg changes , negative cardiac enzymes,  however, high risk for  cardiac disease cardiology recommends inpatient stress,  given recent travel preceding symptoms as well as pleuritic characteristics would check for PE .   - Cardiology consult appreciated   - telemetry   - Nuclear stress test   - f/u DDimer - if positive obtain CTA chest   - Venous duplex LE  to r/o DVT   - am lipid panel , TSH , and A1c   - continue daily aspirin   - Atorvastatin 80mg   - starting  metoprolol 12.5 bid

## 2018-10-18 ENCOUNTER — TRANSCRIPTION ENCOUNTER (OUTPATIENT)
Age: 48
End: 2018-10-18

## 2018-10-18 VITALS
RESPIRATION RATE: 18 BRPM | DIASTOLIC BLOOD PRESSURE: 65 MMHG | OXYGEN SATURATION: 98 % | SYSTOLIC BLOOD PRESSURE: 101 MMHG | HEART RATE: 57 BPM | TEMPERATURE: 98 F

## 2018-10-18 DIAGNOSIS — K21.9 GASTRO-ESOPHAGEAL REFLUX DISEASE WITHOUT ESOPHAGITIS: ICD-10-CM

## 2018-10-18 LAB
ANION GAP SERPL CALC-SCNC: 13 MMOL/L — SIGNIFICANT CHANGE UP (ref 5–17)
BUN SERPL-MCNC: 14 MG/DL — SIGNIFICANT CHANGE UP (ref 7–23)
CALCIUM SERPL-MCNC: 9.1 MG/DL — SIGNIFICANT CHANGE UP (ref 8.4–10.5)
CHLORIDE SERPL-SCNC: 105 MMOL/L — SIGNIFICANT CHANGE UP (ref 96–108)
CO2 SERPL-SCNC: 22 MMOL/L — SIGNIFICANT CHANGE UP (ref 22–31)
CREAT SERPL-MCNC: 0.68 MG/DL — SIGNIFICANT CHANGE UP (ref 0.5–1.3)
GLUCOSE SERPL-MCNC: 189 MG/DL — HIGH (ref 70–99)
HCT VFR BLD CALC: 43 % — SIGNIFICANT CHANGE UP (ref 34.5–45)
HGB BLD-MCNC: 14.1 G/DL — SIGNIFICANT CHANGE UP (ref 11.5–15.5)
MCHC RBC-ENTMCNC: 28.9 PG — SIGNIFICANT CHANGE UP (ref 27–34)
MCHC RBC-ENTMCNC: 32.8 GM/DL — SIGNIFICANT CHANGE UP (ref 32–36)
MCV RBC AUTO: 88.2 FL — SIGNIFICANT CHANGE UP (ref 80–100)
PLATELET # BLD AUTO: 231 K/UL — SIGNIFICANT CHANGE UP (ref 150–400)
POTASSIUM SERPL-MCNC: 4.1 MMOL/L — SIGNIFICANT CHANGE UP (ref 3.5–5.3)
POTASSIUM SERPL-SCNC: 4.1 MMOL/L — SIGNIFICANT CHANGE UP (ref 3.5–5.3)
RBC # BLD: 4.88 M/UL — SIGNIFICANT CHANGE UP (ref 3.8–5.2)
RBC # FLD: 12.2 % — SIGNIFICANT CHANGE UP (ref 10.3–14.5)
SODIUM SERPL-SCNC: 140 MMOL/L — SIGNIFICANT CHANGE UP (ref 135–145)
WBC # BLD: 6.9 K/UL — SIGNIFICANT CHANGE UP (ref 3.8–10.5)
WBC # FLD AUTO: 6.9 K/UL — SIGNIFICANT CHANGE UP (ref 3.8–10.5)

## 2018-10-18 PROCEDURE — 76700 US EXAM ABDOM COMPLETE: CPT | Mod: 26

## 2018-10-18 PROCEDURE — 99239 HOSP IP/OBS DSCHRG MGMT >30: CPT

## 2018-10-18 PROCEDURE — 93306 TTE W/DOPPLER COMPLETE: CPT | Mod: 26

## 2018-10-18 RX ORDER — LISINOPRIL 2.5 MG/1
1 TABLET ORAL
Qty: 0 | Refills: 0 | COMMUNITY

## 2018-10-18 RX ORDER — SITAGLIPTIN AND METFORMIN HYDROCHLORIDE 500; 50 MG/1; MG/1
1 TABLET, FILM COATED ORAL
Qty: 0 | Refills: 0 | COMMUNITY

## 2018-10-18 RX ORDER — INSULIN DETEMIR 100/ML (3)
20 INSULIN PEN (ML) SUBCUTANEOUS
Qty: 0 | Refills: 0 | COMMUNITY

## 2018-10-18 RX ORDER — ATORVASTATIN CALCIUM 80 MG/1
1 TABLET, FILM COATED ORAL
Qty: 0 | Refills: 0 | COMMUNITY

## 2018-10-18 RX ORDER — METOPROLOL TARTRATE 50 MG
0.5 TABLET ORAL
Qty: 30 | Refills: 0 | OUTPATIENT
Start: 2018-10-18 | End: 2018-11-16

## 2018-10-18 RX ORDER — HYDRALAZINE HCL 50 MG
10 TABLET ORAL ONCE
Qty: 0 | Refills: 0 | Status: DISCONTINUED | OUTPATIENT
Start: 2018-10-18 | End: 2018-10-18

## 2018-10-18 RX ORDER — PANTOPRAZOLE SODIUM 20 MG/1
40 TABLET, DELAYED RELEASE ORAL
Qty: 0 | Refills: 0 | Status: DISCONTINUED | OUTPATIENT
Start: 2018-10-18 | End: 2018-10-18

## 2018-10-18 RX ORDER — ASPIRIN/CALCIUM CARB/MAGNESIUM 324 MG
1 TABLET ORAL
Qty: 0 | Refills: 0 | COMMUNITY

## 2018-10-18 RX ORDER — PANTOPRAZOLE SODIUM 20 MG/1
1 TABLET, DELAYED RELEASE ORAL
Qty: 30 | Refills: 0 | OUTPATIENT
Start: 2018-10-18 | End: 2018-11-16

## 2018-10-18 RX ADMIN — Medication 81 MILLIGRAM(S): at 11:35

## 2018-10-18 RX ADMIN — Medication 2: at 08:48

## 2018-10-18 RX ADMIN — Medication 2: at 13:12

## 2018-10-18 RX ADMIN — PANTOPRAZOLE SODIUM 40 MILLIGRAM(S): 20 TABLET, DELAYED RELEASE ORAL at 11:35

## 2018-10-18 RX ADMIN — ENOXAPARIN SODIUM 40 MILLIGRAM(S): 100 INJECTION SUBCUTANEOUS at 06:01

## 2018-10-18 RX ADMIN — Medication 12.5 MILLIGRAM(S): at 06:02

## 2018-10-18 NOTE — DISCHARGE NOTE ADULT - ADDITIONAL INSTRUCTIONS
f/u with PCP within 1 week of discharge from hospital f/u with PCP within 1 week of discharge from hospital  f/u with [phone]

## 2018-10-18 NOTE — DISCHARGE NOTE ADULT - MEDICATION SUMMARY - MEDICATIONS TO TAKE
I will START or STAY ON the medications listed below when I get home from the hospital:    aspirin 81 mg oral tablet  -- 1 tab(s) by mouth once a day  -- Indication: For antiplatelet    Janumet 50 mg-1000 mg oral tablet  -- 1 tab(s) by mouth 2 times a day  -- Indication: For Diabetes    Levemir 100 units/mL subcutaneous solution  -- 20 unit(s) subcutaneous once a day (at bedtime)  -- Indication: For Diabetes    atorvastatin 20 mg oral tablet  -- 1 tab(s) by mouth once a day  -- Indication: For Cholesterol    metoprolol tartrate 25 mg oral tablet  -- 0.5 tab(s) by mouth 2 times a day   -- It is very important that you take or use this exactly as directed.  Do not skip doses or discontinue unless directed by your doctor.  May cause drowsiness.  Alcohol may intensify this effect.  Use care when operating dangerous machinery.  Some non-prescription drugs may aggravate your condition.  Read all labels carefully.  If a warning appears, check with your doctor before taking.  Take with food or milk.  This drug may impair the ability to drive or operate machinery.  Use care until you become familiar with its effects.    -- Indication: For HTN and HR    pantoprazole 40 mg oral delayed release tablet  -- 1 tab(s) by mouth once a day (before a meal)  -- Indication: For GI

## 2018-10-18 NOTE — PROVIDER CONTACT NOTE (MEDICATION) - ASSESSMENT
At 3am site was CDI no redness warmth noted. At 0600 Rn assessed site area surrounding dressing was red. At 3am site was CDI no redness warmth noted. At 0600 Rn assessed skin surrounding dressing was red. Pulses +2 palpation. No swelling, no drainage, no warmth

## 2018-10-18 NOTE — PROGRESS NOTE ADULT - SUBJECTIVE AND OBJECTIVE BOX
Patient is a 48y old  Female who presents with a chief complaint of chest pain x two weeks (18 Oct 2018 11:26)    [facility] : Rama [id number]   SUBJECTIVE / OVERNIGHT EVENTS: Patient seen and examined at bedside. Denies any chest pain, SOB, and n/v. Pt c/o burning sensation in epigastric region, chronic complaint.     ROS:  All other review of systems negative    Allergies    No Known Allergies    Intolerances        MEDICATIONS  (STANDING):  aspirin enteric coated 81 milliGRAM(s) Oral daily  atorvastatin 80 milliGRAM(s) Oral at bedtime  dextrose 5%. 1000 milliLiter(s) (50 mL/Hr) IV Continuous <Continuous>  dextrose 50% Injectable 25 Gram(s) IV Push once  enoxaparin Injectable 40 milliGRAM(s) SubCutaneous every 24 hours  influenza   Vaccine 0.5 milliLiter(s) IntraMuscular once  insulin glargine Injectable (LANTUS) 20 Unit(s) SubCutaneous at bedtime  insulin lispro (HumaLOG) corrective regimen sliding scale   SubCutaneous three times a day before meals  insulin lispro (HumaLOG) corrective regimen sliding scale   SubCutaneous at bedtime  metoprolol tartrate 12.5 milliGRAM(s) Oral two times a day  pantoprazole    Tablet 40 milliGRAM(s) Oral before breakfast    MEDICATIONS  (PRN):  acetaminophen   Tablet .. 650 milliGRAM(s) Oral every 6 hours PRN Mild Pain (1 - 3)  dextrose 40% Gel 15 Gram(s) Oral once PRN Blood Glucose LESS THAN 70 milliGRAM(s)/deciliter  glucagon  Injectable 1 milliGRAM(s) IntraMuscular once PRN Glucose LESS THAN 70 milligrams/deciliter  senna 2 Tablet(s) Oral at bedtime PRN Constipation      Vital Signs Last 24 Hrs  T(C): 36.4 (18 Oct 2018 07:51), Max: 36.8 (17 Oct 2018 18:39)  T(F): 97.5 (18 Oct 2018 07:51), Max: 98.2 (17 Oct 2018 18:39)  HR: 57 (18 Oct 2018 07:51) (57 - 72)  BP: 101/65 (18 Oct 2018 07:51) (94/57 - 124/86)  BP(mean): --  RR: 18 (18 Oct 2018 07:51) (17 - 18)  SpO2: 98% (18 Oct 2018 07:51) (97% - 99%)  CAPILLARY BLOOD GLUCOSE      POCT Blood Glucose.: 231 mg/dL (18 Oct 2018 12:15)  POCT Blood Glucose.: 220 mg/dL (18 Oct 2018 08:11)  POCT Blood Glucose.: 198 mg/dL (17 Oct 2018 21:45)  POCT Blood Glucose.: 285 mg/dL (17 Oct 2018 19:00)    I&O's Summary    17 Oct 2018 07:01  -  18 Oct 2018 07:00  --------------------------------------------------------  IN: 240 mL / OUT: 0 mL / NET: 240 mL    18 Oct 2018 07:01  -  18 Oct 2018 15:18  --------------------------------------------------------  IN: 240 mL / OUT: 0 mL / NET: 240 mL        PHYSICAL EXAM:  GENERAL: NAD, well-developed  HEAD:  Atraumatic, Normocephalic  EYES: EOMI, PERRLA, conjunctiva and sclera clear  NECK: Supple, No JVD  CHEST/LUNG: Clear to auscultation bilaterally; No wheeze  HEART: Regular rate and rhythm; No murmurs, rubs, or gallops  ABDOMEN: Soft, Nontender, Nondistended; Bowel sounds present  EXTREMITIES:  2+ Peripheral Pulses, No clubbing, cyanosis, or edema  NEUROLOGY: AAOx3, non-focal  PSYCH: calm  SKIN: No rashes or lesions    LABS:                        14.1   6.9   )-----------( 231      ( 18 Oct 2018 06:27 )             43.0     10-18    140  |  105  |  14  ----------------------------<  189<H>  4.1   |  22  |  0.68    Ca    9.1      18 Oct 2018 06:27    TPro  6.8  /  Alb  4.1  /  TBili  0.7  /  DBili  x   /  AST  18  /  ALT  32  /  AlkPhos  76  10-17              RADIOLOGY & ADDITIONAL TESTS:    Imaging Personally Reviewed: U/s abd results reviewed     Case Discussed with Family: Discussed with dtr: Aaron, was made aware of u/s abd results. Patient is medically stable for discharge.

## 2018-10-18 NOTE — DISCHARGE NOTE ADULT - MEDICATION SUMMARY - MEDICATIONS TO STOP TAKING
I will STOP taking the medications listed below when I get home from the hospital:    lisinopril 5 mg oral tablet  -- 1 tab(s) by mouth once a day    naproxen 250 mg oral tablet  -- 1 tab(s) by mouth 2 times a day

## 2018-10-18 NOTE — PROGRESS NOTE ADULT - PROBLEM SELECTOR PLAN 1
resolved   CXR clear , no ischemic ekg changes , negative cardiac enzymes,   abnormal stress test as an outpatient, mild reversible defect in anterior wall. Due ongoing chest pain, she was scheduled to have a cardiac tomorrow at [place] but presented to [facility] ED last night  - DDimer: neg, b/l LE: neg for DVT  - Cardiology consult appreciated   - Continue ASA 81mg po daily, toprol xl 12.5mg po daily, lipitor 40mg po daily. -   - s/p LHC: wnl   - telemetry d/c  - continue daily aspirin ,Atorvastatin 80mg , metoprolol 12.5 bid

## 2018-10-18 NOTE — DISCHARGE NOTE ADULT - PLAN OF CARE
s/p neg cardiac cath f/u with PCP   take prescribed medications monitor BS  take prescribed medication  f/u wit PCP take prescribed medication  f/u with PCP take prescribed medciation  f/u with PCP take prescribed mediation  OP GI workup as per PCP

## 2018-10-18 NOTE — DISCHARGE NOTE ADULT - HOSPITAL COURSE
47 yo female  who had an abnormal stress test as an outpatient, mild reversible defect in anterior wall. Due ongoing chest pain, she was scheduled to have a cardiac cath at [place] but presented to [facility] ED 49 yo female  who had an abnormal stress test as an outpatient, mild reversible defect in anterior wall. Due ongoing chest pain, she was scheduled to have a cardiac cath at [place] but presented to [facility] ED; troponin wnl; TTE neg for valvular disease; cardiac cath diagnostic- no intervention; diabetes controlled; abd US - 49 yo female  who had an abnormal stress test as an outpatient, mild reversible defect in anterior wall. Due ongoing chest pain, she was scheduled to have a cardiac cath at [place] but presented to [facility] ED; troponin wnl; TTE neg for valvular disease; cardiac cath diagnostic- no intervention; diabetes controlled; abd US -hepatic steatosis; no evidence of gallstones or BD dilatation;r enal lesions suggestive of angiomyolipomas

## 2018-10-18 NOTE — DISCHARGE NOTE ADULT - PATIENT PORTAL LINK FT
You can access the [facility] Patient Portal, offered by [facility], by registering with the following website: [email or web address]

## 2018-10-18 NOTE — DISCHARGE NOTE ADULT - CARE PLAN
Principal Discharge DX:	Chest pain in adult  Goal:	s/p neg cardiac cath  Assessment and plan of treatment:	f/u with PCP   take prescribed medications  Secondary Diagnosis:	DM2 (diabetes mellitus, type 2)  Assessment and plan of treatment:	monitor BS  take prescribed medication  f/u wit PCP  Secondary Diagnosis:	HTN (hypertension)  Assessment and plan of treatment:	take prescribed medication  f/u with PCP  Secondary Diagnosis:	Hyperlipidemia  Assessment and plan of treatment:	take prescribed medciation  f/u with PCP  Secondary Diagnosis:	Dyspepsia  Assessment and plan of treatment:	take prescribed mediation  OP GI workup as per PCP

## 2018-10-18 NOTE — DISCHARGE NOTE ADULT - PROVIDER TOKENS
FREE:[LAST:[Jeremy],FIRST:[Yuniel],PHONE:[(   )    -],FAX:[(   )    -]],FREE:[LAST:[Demar],FIRST:[],PHONE:[(   )    -],FAX:[(   )    -]]

## 2018-11-22 NOTE — PATIENT PROFILE ADULT - INTERPRETATION SERVICES DECLINED
Reason For Visit  FRANKO BAINS is here today for a nurse visit for blood work.      Current Meds   1. DDAVP 0.01 % Nasal Solution (Desmopressin Acetate [place]); INSTILL 1 SPRAY TWICE   DAILY;   Therapy: 27Apr2016 to (Last Rx:[id number])  Requested for: 01Nov2016 Ordered   2. Desoximetasone 0.25 % External Cream;   Therapy: 26Jun2012 to Recorded   3. Fluticasone Propionate 50 MCG/ACT Nasal Suspension; INSTILL 2 SPRAYS IN EACH   NOSTRIL ONCE DAILY, SEPARATE BY 12 HOURS FOR DDAVP;   Therapy: 07Nov2014 to (Last Rx:[id number])  Requested for: 07Nov2014 Ordered   4. Losartan Potassium 50 MG Oral Tablet; TAKE 1 TABLET DAILY AS DIRECTED;   Therapy: 12Oct2017 to (Evaluate:[id number])  Requested for: 12Oct2017; Last   Rx:[id number] Ordered   5. Mometasone Furoate 50 MCG/ACT Nasal Suspension; 2 sprays each nostril qd;   Therapy: 57Agz0699 to (Last Rx:[id number]) Ordered   6. Probiotic Oral Capsule;   Therapy: [id number] to Recorded    Allergies  No Known Drug Allergies    Signatures   Electronically signed by : ANAHI Corea; Oct 17 2017  4:11PM CST    
Patient/Caregiver requests family/friend to interpret.

## 2018-12-26 PROCEDURE — C1769: CPT

## 2018-12-26 PROCEDURE — 93005 ELECTROCARDIOGRAM TRACING: CPT

## 2018-12-26 PROCEDURE — C1894: CPT

## 2018-12-26 PROCEDURE — 83036 HEMOGLOBIN GLYCOSYLATED A1C: CPT

## 2018-12-26 PROCEDURE — 93458 L HRT ARTERY/VENTRICLE ANGIO: CPT

## 2018-12-26 PROCEDURE — 80061 LIPID PANEL: CPT

## 2018-12-26 PROCEDURE — 93970 EXTREMITY STUDY: CPT

## 2018-12-26 PROCEDURE — 83880 ASSAY OF NATRIURETIC PEPTIDE: CPT

## 2018-12-26 PROCEDURE — 80053 COMPREHEN METABOLIC PANEL: CPT

## 2018-12-26 PROCEDURE — 82962 GLUCOSE BLOOD TEST: CPT

## 2018-12-26 PROCEDURE — 80048 BASIC METABOLIC PNL TOTAL CA: CPT

## 2018-12-26 PROCEDURE — 93306 TTE W/DOPPLER COMPLETE: CPT

## 2018-12-26 PROCEDURE — 99152 MOD SED SAME PHYS/QHP 5/>YRS: CPT

## 2018-12-26 PROCEDURE — 71046 X-RAY EXAM CHEST 2 VIEWS: CPT

## 2018-12-26 PROCEDURE — 84484 ASSAY OF TROPONIN QUANT: CPT

## 2018-12-26 PROCEDURE — 84443 ASSAY THYROID STIM HORMONE: CPT

## 2018-12-26 PROCEDURE — 83690 ASSAY OF LIPASE: CPT

## 2018-12-26 PROCEDURE — 99285 EMERGENCY DEPT VISIT HI MDM: CPT | Mod: 25

## 2018-12-26 PROCEDURE — 85379 FIBRIN DEGRADATION QUANT: CPT

## 2018-12-26 PROCEDURE — 76700 US EXAM ABDOM COMPLETE: CPT

## 2018-12-26 PROCEDURE — 85027 COMPLETE CBC AUTOMATED: CPT

## 2018-12-26 PROCEDURE — C1887: CPT

## 2019-05-07 PROBLEM — I10 ESSENTIAL (PRIMARY) HYPERTENSION: Chronic | Status: ACTIVE | Noted: 2018-10-16

## 2019-05-07 PROBLEM — Z00.00 ENCOUNTER FOR PREVENTIVE HEALTH EXAMINATION: Status: ACTIVE | Noted: 2019-05-07

## 2019-05-07 PROBLEM — E11.9 TYPE 2 DIABETES MELLITUS WITHOUT COMPLICATIONS: Chronic | Status: ACTIVE | Noted: 2018-10-16

## 2019-05-09 ENCOUNTER — APPOINTMENT (OUTPATIENT)
Dept: ORTHOPEDIC SURGERY | Facility: CLINIC | Age: 49
End: 2019-05-09
Payer: MEDICAID

## 2019-05-09 VITALS — BODY MASS INDEX: 25.11 KG/M2 | HEIGHT: 67 IN | WEIGHT: 160 LBS

## 2019-05-09 DIAGNOSIS — G89.29 PAIN IN RIGHT KNEE: ICD-10-CM

## 2019-05-09 DIAGNOSIS — M25.561 PAIN IN RIGHT KNEE: ICD-10-CM

## 2019-05-09 DIAGNOSIS — M25.562 PAIN IN RIGHT KNEE: ICD-10-CM

## 2019-05-09 PROCEDURE — 73522 X-RAY EXAM HIPS BI 3-4 VIEWS: CPT

## 2019-05-09 PROCEDURE — 99204 OFFICE O/P NEW MOD 45 MIN: CPT

## 2019-05-09 PROCEDURE — 73562 X-RAY EXAM OF KNEE 3: CPT | Mod: RT

## 2019-06-18 ENCOUNTER — APPOINTMENT (OUTPATIENT)
Dept: INTERNAL MEDICINE | Facility: CLINIC | Age: 49
End: 2019-06-18

## 2021-10-28 NOTE — PROGRESS NOTE ADULT - PROBLEM SELECTOR PROBLEM 1
Chest pain in adult
Chest pain in adult
Unstable angina
Pt well appearing, CT scan demonstrates no acute pathology. lac repaired. splint placed to finger, fu with ortho.